# Patient Record
Sex: MALE | Race: AMERICAN INDIAN OR ALASKA NATIVE
[De-identification: names, ages, dates, MRNs, and addresses within clinical notes are randomized per-mention and may not be internally consistent; named-entity substitution may affect disease eponyms.]

---

## 2017-05-26 ENCOUNTER — HOSPITAL ENCOUNTER (EMERGENCY)
Dept: HOSPITAL 31 - C.ER | Age: 57
Discharge: HOME | End: 2017-05-26
Payer: COMMERCIAL

## 2017-05-26 VITALS
DIASTOLIC BLOOD PRESSURE: 84 MMHG | RESPIRATION RATE: 18 BRPM | TEMPERATURE: 98.2 F | OXYGEN SATURATION: 97 % | SYSTOLIC BLOOD PRESSURE: 152 MMHG | HEART RATE: 67 BPM

## 2017-05-26 DIAGNOSIS — M54.41: Primary | ICD-10-CM

## 2017-05-26 NOTE — C.PDOC
History Of Present Illness


57 yr old male presents to the ER for exacerbation of chronic back pain for the 

past several days, right>left lower back, occasionally radiating to the right 

leg. Patient states he has had pain for the past several 8+ years. States the 

pain is made worse with sitting and going to a sitting position. Patient is s/p 

outpatient MRI this week and is pending follow up for results. Patient reports 

relief with naproxen. Denies trauma, injury, abdominal pain, constipation, 

dysuria, incontinence, weakness or numbness. 








EXAC CHRONIC BACK PAIN X SEV DAYS. R>L LOWER BACK, OCC RADIATION R LEG. PS HAS 

HAD PAIN FOR 8+YRS. S/P OUTPT MRI THIS WEEK, PENDING FU FOR RESULTS. +RELIEF W 

NAPROXN. NO TRAUMA WEAKNESS NUMB OTHER ASSOC SX. PAIN WORSE W SITTING AND GOING 

TO SITTING POSITION.





EXAM


MILD DIST NONTOXIC


BACK LIMITED FULL EXTENSION. NO FOCAL TEND


NEURO INTACT


SKIN NEG





MDM


SCIATICA. DEXAMETHASONE ADVISED CONT NSAIDS. LIDODERM, TYLENOL, PMD FU


Time Seen by Provider: 05/26/17 14:07


Chief Complaint (Nursing): Back Pain


Onset/Duration Of Symptoms: Persistent (8+ years ), Worse Since (Last several 

days )





Past Medical History


Reviewed: Historical Data, Nursing Documentation, Vital Signs


Vital Signs: 


 Last Vital Signs











Temp  98.6 F   05/26/17 13:54


 


Pulse  88   05/26/17 13:54


 


Resp  15   05/26/17 13:54


 


BP  146/89   05/26/17 13:54


 


Pulse Ox  99   05/26/17 14:37











Family History: States: No Known Family Hx





- Social History


Hx Alcohol Use: Yes


Hx Substance Use: No





Review Of Systems


Except As Marked, All Systems Reviewed And Found Negative.


Gastrointestinal: Negative for: Abdominal Pain, Constipation


Genitourinary: Negative for: Dysuria, Incontinence


Musculoskeletal: Positive for: Back Pain (Right>Left lower back ), Leg Pain (

Radiating pain to the right leg )


Neurological: Negative for: Weakness, Numbness





Physical Exam





- Physical Exam


Appears: Non-toxic, In Acute Distress (Mild )


Skin: Warm, Dry, No Rash


Neck: Normal, Normal ROM, Supple


Chest: Symmetrical, No Tenderness


Cardiovascular: Rhythm Regular, No Murmur


Respiratory: Normal Breath Sounds, No Rales, No Rhonchi, No Stridor, No Wheezing


Back: Other (Limited full extension. No focal tenderness. )


Extremity: Normal ROM, No Swelling


Neurological/Psych: Oriented x3, Normal Speech, Normal Motor





ED Course And Treatment


O2 Sat by Pulse Oximetry: 99





Progress





- Re-Evaluation


Re-evaluation Note: 





05/26/17 14:12


[facility]


12/01/2016   TRAMADOL HCL 50 MG TABLET   60.0   20 DAYS





- Data Reviewed


Data Reviewed: Other ([facility])





Medical Decision Making


Medical Decision Making: 


PLAN:


* Lidoderm TD 


* Tylenol PO 





NOTE: 


Sciatica pain. Dexamethasone advised and continue with NSAIDS. Lidoderm, 

Tylenol use and to follow up with PMD. 





Disposition


Counseled Patient/Family Regarding: Diagnosis, Need For Followup, Rx Given





- Disposition


Referrals: 


Juan Pablo Avendano MD [Staff Provider] - 


Disposition: HOME/ ROUTINE


Disposition Time: 14:25


Condition: IMPROVED


Additional Instructions: 


REMOVE PATCH 12 HRS AFTER INITIAL APPLICATION.


TAKE TYLENOL AS DIRECTED FOR PAIN IN ADDITION TO NAPROXYN


Prescriptions: 


Dexamethasone 12 mg PO ONCE #2 tab


Lidocaine 5% [Lidoderm] 1 ea TD PRN PRN #10 patch


 PRN Reason: Pain, Moderate (4-7)


Instructions:  Sciatica (ED)





- Clinical Impression


Clinical Impression: 


 Sciatica








- Scribe Statement


The provider has reviewed the documentation as recorded by the Scribe


Britney Price


Provider Attestation: 


All medical record entries made by the Scribe were at my direction and 

personally dictated by me. I have reviewed the chart and agree that the record 

accurately reflects my personal performance of the history, physical exam, 

medical decision making, and the department course for this patient. I have 

also personally directed, reviewed, and agree with the discharge instructions 

and disposition.

## 2017-08-30 ENCOUNTER — HOSPITAL ENCOUNTER (EMERGENCY)
Dept: HOSPITAL 31 - C.ER | Age: 57
Discharge: HOME | End: 2017-08-30
Payer: COMMERCIAL

## 2017-08-30 VITALS
RESPIRATION RATE: 20 BRPM | TEMPERATURE: 98 F | DIASTOLIC BLOOD PRESSURE: 88 MMHG | SYSTOLIC BLOOD PRESSURE: 123 MMHG | HEART RATE: 70 BPM | OXYGEN SATURATION: 99 %

## 2017-08-30 DIAGNOSIS — M54.5: Primary | ICD-10-CM

## 2017-08-30 DIAGNOSIS — G89.29: ICD-10-CM

## 2017-08-30 NOTE — C.PDOC
History Of Present Illness


57 year old male presents to ED for evaluation of exacerbation of chronic back 

pain and bilateral knee pain for the last 4 months. Pt states he has had 

similar pain for the last 8 years. Patient states pain is worse in supine 

position and with sudden twisting of back while walking. Outpatient MRI report 

was reviewed, which shows herniated disc in the lower lumbar region, and 

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 Also reports bilateral knee pain that is 

worse with weight bearing. Outpatient x-ray of knee also shows degenerative 

joint disease. No focal weakness, or numbness. States he is a prior pt of 

Phyllis Coyne at [facility], but is no longer a pt since 

he has [facility] now. Pt is pending first clinic appointment on 10/3. Pt was 

advised that he may need back surgery before switching insurance. Pt states he 

was previously taking Prednisone 10mg and Naprosyn but ran out on 8/19, and 

states "I've been cutting them in half".








CO EXAC CHRONIC BACK PAIN AND B/L KNEE PAIN X 4 MONTHS. PS HAS HAD SIM PAIN X 8 

YRS. OUTPT MRI REPORTS REVIEWED, +SONU DISC MULT LOWER LUMBAR, +DJD HIP. PAIN 

WORSE WHEN SUPINE OR SUDDEN TWISTING BACK WHILE WALKING. +B/L KNEE PAIN WORSE W 

WT BEAR, +DJD OUTPT XRAY REPORTS. NO FOCAL WEAK,NUMB. 


PS PRIOR PT OF PHYLLIS COYNE @ [facility]  BUT NO LONGER 

DUE TO NOW HAVING [facility]. PENDING FIRST CLINIC APPT 10/3.  PS WAS ADVISED 

NEEDS BACK SURGERY BEFORE SWITCHING INSURANCE.





PREVIOUSLY ON PREDNISONE 10 MG AND NAPROSYN BUT RAN OUT 8/19. "MICHAEL BEEN CUTTING 

THEM IN HALF". 





EXAM


MILD DIST NONTOXIC


BACK LIMITED ROM DUE TO PAIN. FULL FLEX.


NEURO INTACT


GAIT WNL





MDM


PT ADVISED OF NARC PAIN MED POLICY. TYL #4 AT NIGHT, REFILL NAPROSYN AND 

PREDNISON,  CLINIC.


Time Seen by Provider: 08/30/17 09:33


Chief Complaint (Nursing): Back Pain


History Per: Patient


History/Exam Limitations: no limitations


Onset/Duration Of Symptoms: Days


Current Symptoms Are (Timing): Still Present


Quality Of Discomfort: "Pain"


Previous Symptoms: Chronic Pain.  denies: Prior Injury


Associated Symptoms: None.  denies: Incontinence, New Weakness, New Numbness


Recent travel outside of the [place]: No


Additional History Per: Patient





Past Medical History


Reviewed: Historical Data, Nursing Documentation, Vital Signs


Vital Signs: 


 Last Vital Signs











Temp  98 F   08/30/17 09:02


 


Pulse  70   08/30/17 09:02


 


Resp  20   08/30/17 09:02


 


BP  123/88   08/30/17 09:02


 


Pulse Ox  99   08/30/17 10:44














- Medical History


PMH: Arthritis


Family History: States: Unknown Family Hx





- Social History


Hx Alcohol Use: Yes


Hx Substance Use: Yes





- Immunization History


Hx Tetanus Toxoid Vaccination: Yes


Hx Influenza Vaccination: Yes


Hx Pneumococcal Vaccination: Yes





Review Of Systems


Except As Marked, All Systems Reviewed And Found Negative.


Constitutional: Negative for: Fever, Chills


Gastrointestinal: Negative for: Nausea, Vomiting


Genitourinary: Negative for: Dysuria, Frequency, Hematuria


Musculoskeletal: Positive for: Back Pain, Leg Pain (bilateral knee pain)


Neurological: Negative for: Weakness, Numbness





Physical Exam





- Physical Exam


Appears: Non-toxic, Other (In mild distress)


Skin: Normal Color, Warm, Dry


Head: Atraumatic, Normacephalic


Back: No Vertebral Tenderness, Decreased ROM (secondary to pain. Full flex), No 

Paraspinal Tenderness


Extremity: Normal ROM (FROM of bilateral knee joint), No Tenderness, No Calf 

Tenderness, Capillary Refill (<2 sec.), No Deformity, No Swelling


Extremity: Bilateral: Atraumatic, Normal Color And Temperature, Normal ROM


Neurological/Psych: Oriented x3, Normal Speech, Normal Cognition, Normal Motor, 

Normal Sensation, Other (neuro intact)


Gait: Steady





ED Course And Treatment


O2 Sat by Pulse Oximetry: 99 (on RA)


Pulse Ox Interpretation: Normal





Medical Decision Making


Medical Decision Making: 


Pt was advised of Narcotic pain med policy. Advised to take Tylenol #4 at night

, and to refill Naprosyn and Prednisone. Instructed followup in clinic. 








Disposition


Counseled Patient/Family Regarding: Diagnosis, Need For Followup, Rx Given





- Disposition


Referrals: 


[facility] [Outside]


[facility] [Outside]


Disposition: HOME/ ROUTINE


Disposition Time: 09:50


Condition: IMPROVED


Prescriptions: 


Acetaminophen/Cod NO 4 [Tylenol/Cod 300 mg-60 mg] 1 tab PO Q4 PRN #20 tab


 PRN Reason: Pain, Moderate (4-7)


Naproxen 500 mg PO BID #30 tab


predniSONE [predniSONE Tab] 10 mg PO DAILY #30 tab


Instructions:  Chronic Pain (ED)


Forms:  [facility] (English)





- Clinical Impression


Clinical Impression: 


 Acute exacerbation of chronic low back pain








- Scribe Statement


The provider has reviewed the documentation as recorded by the Scribe





Felix Fox





All medical record entries made by the Felipeibe were at my direction and 

personally dictated by me. I have reviewed the chart and agree that the record 

accurately reflects my personal performance of the history, physical exam, 

medical decision making, and the department course for this patient. I have 

also personally directed, reviewed, and agree with the discharge instructions 

and disposition.

## 2019-04-14 ENCOUNTER — HOSPITAL ENCOUNTER (OUTPATIENT)
Dept: HOSPITAL 31 - C.ER | Age: 59
Setting detail: OBSERVATION
LOS: 1 days | Discharge: LEFT BEFORE DISCHARGE | End: 2019-04-15
Attending: INTERNAL MEDICINE | Admitting: INTERNAL MEDICINE
Payer: MEDICAID

## 2019-04-14 VITALS — RESPIRATION RATE: 20 BRPM

## 2019-04-14 VITALS — BODY MASS INDEX: 21.2 KG/M2

## 2019-04-14 DIAGNOSIS — R07.9: ICD-10-CM

## 2019-04-14 DIAGNOSIS — I10: ICD-10-CM

## 2019-04-14 DIAGNOSIS — M19.90: ICD-10-CM

## 2019-04-14 DIAGNOSIS — F17.210: Primary | ICD-10-CM

## 2019-04-14 DIAGNOSIS — D53.9: ICD-10-CM

## 2019-04-14 DIAGNOSIS — F12.10: ICD-10-CM

## 2019-04-14 DIAGNOSIS — D72.819: ICD-10-CM

## 2019-04-14 LAB
ALBUMIN SERPL-MCNC: 4 G/DL (ref 3.5–5)
ALBUMIN/GLOB SERPL: 1.3 {RATIO} (ref 1–2.1)
ALT SERPL-CCNC: 20 U/L (ref 21–72)
APTT BLD: 30 SECONDS (ref 21–34)
AST SERPL-CCNC: 83 U/L (ref 17–59)
BASOPHILS # BLD AUTO: 0 K/UL (ref 0–0.2)
BASOPHILS NFR BLD: 1 % (ref 0–2)
BILIRUB UR-MCNC: NEGATIVE MG/DL
BNP SERPL-MCNC: 43.3 PG/ML (ref 0–900)
BUN SERPL-MCNC: 15 MG/DL (ref 9–20)
CALCIUM SERPL-MCNC: 8.7 MG/DL (ref 8.6–10.4)
CK MB SERPL-MCNC: 0.93 NG/ML (ref 0–3.38)
CK MB SERPL-MCNC: 0.97 NG/ML (ref 0–3.38)
COLOR UR: YELLOW
EOSINOPHIL # BLD AUTO: 0.1 K/UL (ref 0–0.7)
EOSINOPHIL NFR BLD: 1.8 % (ref 0–4)
ERYTHROCYTE [DISTWIDTH] IN BLOOD BY AUTOMATED COUNT: 13.2 % (ref 11.5–14.5)
GFR NON-AFRICAN AMERICAN: > 60
GLUCOSE UR STRIP-MCNC: NEGATIVE MG/DL
HGB BLD-MCNC: 12.7 G/DL (ref 12–18)
INR PPP: 1
LEUKOCYTE ESTERASE UR-ACNC: NEGATIVE LEU/UL
LIPASE: 144 U/L (ref 23–300)
LYMPHOCYTES # BLD AUTO: 1.5 K/UL (ref 1–4.3)
LYMPHOCYTES NFR BLD AUTO: 42.1 % (ref 20–40)
MCH RBC QN AUTO: 34.8 PG (ref 27–31)
MCHC RBC AUTO-ENTMCNC: 33.3 G/DL (ref 33–37)
MCV RBC AUTO: 104.5 FL (ref 80–94)
MONOCYTES # BLD: 0.5 K/UL (ref 0–0.8)
MONOCYTES NFR BLD: 13.1 % (ref 0–10)
NEUTROPHILS # BLD: 1.5 K/UL (ref 1.8–7)
NEUTROPHILS NFR BLD AUTO: 42 % (ref 50–75)
NRBC BLD AUTO-RTO: 0 % (ref 0–2)
PH UR STRIP: 6 [PH] (ref 5–8)
PLATELET # BLD: 202 K/UL (ref 130–400)
PMV BLD AUTO: 8 FL (ref 7.2–11.7)
PROT UR STRIP-MCNC: NEGATIVE MG/DL
PROTHROMBIN TIME: 10.4 SECONDS (ref 9.7–12.2)
RBC # BLD AUTO: 3.65 MIL/UL (ref 4.4–5.9)
RBC # UR STRIP: (no result) /UL
SP GR UR STRIP: 1.02 (ref 1–1.03)
SQUAMOUS EPITHIAL: < 1 /HPF (ref 0–5)
TROPONIN I SERPL-MCNC: 0.01 NG/ML (ref 0–0.12)
UROBILINOGEN UR-MCNC: 0.2 MG/DL (ref 0.2–1)
WBC # BLD AUTO: 3.5 K/UL (ref 4.8–10.8)

## 2019-04-14 PROCEDURE — 83992 ASSAY FOR PHENCYCLIDINE: CPT

## 2019-04-14 PROCEDURE — 85025 COMPLETE CBC W/AUTO DIFF WBC: CPT

## 2019-04-14 PROCEDURE — 83036 HEMOGLOBIN GLYCOSYLATED A1C: CPT

## 2019-04-14 PROCEDURE — 83735 ASSAY OF MAGNESIUM: CPT

## 2019-04-14 PROCEDURE — 83880 ASSAY OF NATRIURETIC PEPTIDE: CPT

## 2019-04-14 PROCEDURE — 80353 DRUG SCREENING COCAINE: CPT

## 2019-04-14 PROCEDURE — 80345 DRUG SCREENING BARBITURATES: CPT

## 2019-04-14 PROCEDURE — 85730 THROMBOPLASTIN TIME PARTIAL: CPT

## 2019-04-14 PROCEDURE — 71045 X-RAY EXAM CHEST 1 VIEW: CPT

## 2019-04-14 PROCEDURE — 36415 COLL VENOUS BLD VENIPUNCTURE: CPT

## 2019-04-14 PROCEDURE — 78452 HT MUSCLE IMAGE SPECT MULT: CPT

## 2019-04-14 PROCEDURE — 85610 PROTHROMBIN TIME: CPT

## 2019-04-14 PROCEDURE — 80053 COMPREHEN METABOLIC PANEL: CPT

## 2019-04-14 PROCEDURE — 84484 ASSAY OF TROPONIN QUANT: CPT

## 2019-04-14 PROCEDURE — 84443 ASSAY THYROID STIM HORMONE: CPT

## 2019-04-14 PROCEDURE — 81001 URINALYSIS AUTO W/SCOPE: CPT

## 2019-04-14 PROCEDURE — 80324 DRUG SCREEN AMPHETAMINES 1/2: CPT

## 2019-04-14 PROCEDURE — 86703 HIV-1/HIV-2 1 RESULT ANTBDY: CPT

## 2019-04-14 PROCEDURE — 80361 OPIATES 1 OR MORE: CPT

## 2019-04-14 PROCEDURE — 93005 ELECTROCARDIOGRAM TRACING: CPT

## 2019-04-14 PROCEDURE — 80346 BENZODIAZEPINES1-12: CPT

## 2019-04-14 PROCEDURE — 80061 LIPID PANEL: CPT

## 2019-04-14 PROCEDURE — 93306 TTE W/DOPPLER COMPLETE: CPT

## 2019-04-14 PROCEDURE — 99285 EMERGENCY DEPT VISIT HI MDM: CPT

## 2019-04-14 PROCEDURE — 86706 HEP B SURFACE ANTIBODY: CPT

## 2019-04-14 PROCEDURE — 80074 ACUTE HEPATITIS PANEL: CPT

## 2019-04-14 PROCEDURE — 80349 CANNABINOIDS NATURAL: CPT

## 2019-04-14 PROCEDURE — 83690 ASSAY OF LIPASE: CPT

## 2019-04-14 PROCEDURE — 93017 CV STRESS TEST TRACING ONLY: CPT

## 2019-04-14 PROCEDURE — 80358 DRUG SCREENING METHADONE: CPT

## 2019-04-14 NOTE — RAD
Chest x-ray single frontal view 



HISTORY:

Chest pain. 



COMPARISON:

None available. 



FINDINGS:

Mild venous congestion. 



Biapical pleural thickening with upper lobe granulomatous changes. 



Heart size within normal limits. 



Degenerative changes in the spine. 



Impression: 



Mild venous congestion. 



Biapical pleural thickening with upper lobe granulomatous changes.

## 2019-04-14 NOTE — C.PDOC
History Of Present Illness


Patient presents to the emergency department complaints of chest pain since 

yesterday. Patient denies fever, chills, nausea, and vomiting. Patient is 

speaking in complete sentences. Admits to smoking and drinking almost daily.


Time Seen by Provider: 04/14/19 01:22


Chief Complaint (Nursing): Chest Pain


History Per: Patient


History/Exam Limitations: no limitations


Onset/Duration Of Symptoms: Days (1)


Current Symptoms Are (Timing): Still Present


Context: Other


Severity: Moderate


Pain Scale Rating Of: 4


Quality: Dull, Tightness, Pressure


Associated Symptoms: denies: Nausea, Other (vomiting, fever, vomiting, shortness

of breath)


Modifying Factors: None


Exacerbating Factors: None


Alleviating Factors: None


Recent travel outside of the [place]: No


Additional History Per: Patient





Past Medical History


Reviewed: Historical Data, Nursing Documentation, Vital Signs


Vital Signs: 





                                Last Vital Signs











Temp  98.2 F   04/14/19 01:13


 


Pulse  66   04/14/19 01:13


 


Resp  18   04/14/19 01:13


 


BP  126/79   04/14/19 01:13


 


Pulse Ox  97   04/14/19 01:13














- Medical History


PMH: Arthritis


Surgical History: No Surg Hx


Family History: States: No Known Family Hx





- Social History


Hx Alcohol Use: Yes


Hx Substance Use: Yes





- Immunization History


Hx Tetanus Toxoid Vaccination: Yes


Hx Influenza Vaccination: Yes


Hx Pneumococcal Vaccination: Yes





Review Of Systems


Constitutional: Negative for: Fever, Chills


Eyes: Negative for: Vision Change


ENT: Negative for: Throat Pain


Cardiovascular: Positive for: Chest Pain


Respiratory: Negative for: Cough, Shortness of Breath


Gastrointestinal: Negative for: Nausea, Vomiting


Genitourinary: Negative for: Dysuria


Musculoskeletal: Negative for: Back Pain


Skin: Negative for: Rash


Neurological: Negative for: Weakness, Numbness


Psych: Negative for: Anxiety





Physical Exam





- Physical Exam


Appears: Non-toxic, No Acute Distress


Skin: Warm, Dry


Head: Normacephalic


Eye(s): bilateral: Normal Inspection


Oral Mucosa: Moist


Neck: Normal ROM, Supple


Chest: Symmetrical, No Tenderness


Cardiovascular: Rhythm Regular, No Murmur


Respiratory: No Rales, No Rhonchi, No Wheezing


Gastrointestinal/Abdominal: Soft, No Tenderness


Back: No CVA Tenderness


Extremity: Normal ROM


Extremity: Bilateral: Atraumatic


Pulses: Left Dorsalis Pedis: Normal, Right Dorsalis Pedis: Normal


Neurological/Psych: Oriented x3


Gait: Steady





ED Course And Treatment





- Laboratory Results


Result Diagrams: 


                                 04/14/19 01:51





                                 04/14/19 01:51


ECG: Interpreted By Me, Viewed By Me


ECG Rhythm: Sinus Rhythm (62), Nonspecific Changes


O2 Sat by Pulse Oximetry: 97 (RA)


Pulse Ox Interpretation: Normal





- Radiology


CXR: Interpreted by Me, Viewed By Me


CXR Interpretation: No: Infiltrates, Fracture, Pnemothorax


Progress Note: Plan:  EKG.  Chemistry.  CBC.  CXR.  Ecotrin 325mg PO.  

Urinalysis





Disposition


Discussed With Dr.: Juan Pablo Avendano


Comment: accepted the pt on his service and took over the care at 3:52 AM


Doctor Will See Patient In The: Hospital


Counseled Patient/Family Regarding: Studies Performed, Diagnosis





- Disposition


Disposition: HOSPITALIZED


Disposition Time: 03:52


Condition: FAIR


Forms:  [facility] (English)





- POA


Present On Arrival: None





- Clinical Impression


Clinical Impression: 


 Chest pain








- Scribe Statement


The provider has reviewed the documentation as recorded by the Scribe (Evelio Tsai)


Provider Attestation: 


All medical record entries made by the Scribe were at my direction and 

personally dictated by me. I have reviewed the chart and agree that the record 

accurately reflects my personal performance of the history, physical exam, 

medical decision making, and the department course for this patient. I have also

personally directed, reviewed, and agree with the discharge instructions and 

disposition.





Decision To Admit





- Pt Status Changed To:


Hospital Disposition Of: Observation





- .


Bed Request Type: Telemetry


Admitting Physician: Juan Pablo Avendano


Patient Diagnosis: 


 Chest pain

## 2019-04-15 VITALS — SYSTOLIC BLOOD PRESSURE: 151 MMHG | DIASTOLIC BLOOD PRESSURE: 96 MMHG | TEMPERATURE: 97.6 F | HEART RATE: 65 BPM

## 2019-04-15 VITALS — OXYGEN SATURATION: 98 %

## 2019-04-15 LAB
ALBUMIN SERPL-MCNC: 4.4 G/DL (ref 3.5–5)
ALBUMIN/GLOB SERPL: 1.2 {RATIO} (ref 1–2.1)
ALT SERPL-CCNC: 25 U/L (ref 21–72)
AST SERPL-CCNC: 73 U/L (ref 17–59)
BASOPHILS # BLD AUTO: 0 K/UL (ref 0–0.2)
BASOPHILS NFR BLD: 0.8 % (ref 0–2)
BUN SERPL-MCNC: 11 MG/DL (ref 9–20)
CALCIUM SERPL-MCNC: 9.6 MG/DL (ref 8.6–10.4)
EOSINOPHIL # BLD AUTO: 0 K/UL (ref 0–0.7)
EOSINOPHIL NFR BLD: 0.4 % (ref 0–4)
ERYTHROCYTE [DISTWIDTH] IN BLOOD BY AUTOMATED COUNT: 13.3 % (ref 11.5–14.5)
GFR NON-AFRICAN AMERICAN: > 60
HDLC SERPL-MCNC: 101 MG/DL (ref 30–70)
HEPATITIS A IGM: NEGATIVE
HEPATITIS B CORE AB: NEGATIVE
HEPATITIS C ANTIBODY: NEGATIVE
HGB BLD-MCNC: 15.4 G/DL (ref 12–18)
LDLC SERPL-MCNC: 93 MG/DL (ref 0–129)
LYMPHOCYTES # BLD AUTO: 1 K/UL (ref 1–4.3)
LYMPHOCYTES NFR BLD AUTO: 23.8 % (ref 20–40)
MCH RBC QN AUTO: 35.7 PG (ref 27–31)
MCHC RBC AUTO-ENTMCNC: 34.5 G/DL (ref 33–37)
MCV RBC AUTO: 103.4 FL (ref 80–94)
MONOCYTES # BLD: 0.4 K/UL (ref 0–0.8)
MONOCYTES NFR BLD: 10.2 % (ref 0–10)
NEUTROPHILS # BLD: 2.7 K/UL (ref 1.8–7)
NEUTROPHILS NFR BLD AUTO: 64.8 % (ref 50–75)
NRBC BLD AUTO-RTO: 0.2 % (ref 0–2)
PLATELET # BLD: 209 K/UL (ref 130–400)
PMV BLD AUTO: 9 FL (ref 7.2–11.7)
RBC # BLD AUTO: 4.31 MIL/UL (ref 4.4–5.9)
WBC # BLD AUTO: 4.2 K/UL (ref 4.8–10.8)

## 2019-04-15 NOTE — CARD
--------------- APPROVED REPORT --------------





Date of service: 04/14/2019



EKG Measurement

Heart [id number]

NV [id number]

[id number]

[id number]

[id number]



<Conclusion>

Normal sinus rhythm

Normal ECG

## 2019-04-15 NOTE — CARD
--------------- APPROVED REPORT --------------





Date of service: 04/15/2019



Protocol: GIN

Test Type: NUCLEAR STRESS

Test Indications: CP

Medical History: CP

Target HR: 162 bpm

Resting ECG: normal

Resting Heart Rate: 57 bpm

Resting Blood Pressure: 128/80mmHg

submaximum (85%): 138 bpm



TEST SUMMARY

[id number]/80.0.

PRETESTWARM-UP00:[id number]/.0.

EXERCISESTAGE 103:[id number]/80.0.

EXERCISESTAGE 203:[id number]/80.0.

EXERCISESTAGE 302:[id number]/80.0.

[id number]/80.0.



POST EXERCISE

Reason for Termination: Fatigue

Target HR: No

Max HR: 137 bpm

85% of Maximum Predicted HR: 162 bpm

Exercise duration: 08:13 min:sec, 3 Stage

Exercise capacity: 10.1METs

Max Blood Pressure: 160/80mmHg

Blood Pressure response to exercise: normal resting BP - appropriate 

response

Heart Rate response to exercise: appropriate

Chest Pain: No, none

Angina index: 0

Arrhythmia: Yes, atrial premature beats

ST Change: No, none

Deviation: 0 mm



INTERPRETATION

Stress EKG Conclusion: Normal stress test



EXAM: Myocardial Perfusion REST/STRESS



Imaging Protocol

The imaging protocol used to acquire images was Rest Tc-99m/stress 

Tc-99m 1 day

Rest Spect myocardial perfusion imaging was performed in supine 

position 45 minutes following the injection of 13.1 mCi of Tc-99 

Myoview.

Gated Stress Spect was performed 45 minutes after intravenous 32.0 

mCi Tc-99 Myoview injection.

The images were gated to evaluate regional wall motion and calculate 

ventricular ejection fraction.Images were reconstructed using 

backfilter projection method in short horizontal and verticle long 

axis. Spect slices were generated.



RESTING DATA

EDV99.55glHT5.10L/min

ESV45.00mlMyocardial Vcuj306.00g

Av. Heart Rate56.00bpm

EF55.00%



STRESS DATA

EDV86.47pfXP1.30L/min

ESV32.00mlMyocardial Khov330.00g

EF63.00%

Regional WT score at stress:1.00

Regional WM score at stress:0.00

Summed WT score at stress:12.00

Av. Heart Rate61.00bpmSummed WM score at stress:11.00



LV Perf. Quant

17 Seg. SSS0.00

17 Seg. SRS2.00

17 Seg. SDS0.00

Stress Defect Extent (% LAD)0.00Rest Defect Extent (% 

LAD)0.00Rev. Defect Extent (% LAD)0.00

Stress Defect Extent (% LCX)0.00Rest Defect Extent (% 

LCX)13.80Rev. Defect Extent (% LCX)0.00

Stress Defect Extent (% RCA)0.00Rest Defect Extent (% 

RCA)8.90Rev. Defect Extent (% RCA)0.00

Stress Defect Extent (% SALVATORE)0.00Rest Defect Extent (% 

SALVATORE)5.90Rev. Defect Extent (% SALVATORE)0.00



Left Ventricle

LV Size/Shape: The left ventricle is normal size.

LV Function:Left ventricle systolic function is normal.

The Ejection Fraction is 63% up from 55% at rest.

Regional Wall Motion:No regional wall motion abnormalities noted.



Metabolism/Perfusion

Reversible/Irreversible: There is a medium  perfusion/metabolism 

defect in the Basal inferior wall, at rest only, probable artifact.



Conclusion

1. Left ventricle systolic function is normal.

2. The Ejection Fraction is 63% up from 55% at rest.

3. No regional wall motion abnormalities noted.

4. No Ischemia, adequate exercise tolerance.

## 2019-04-15 NOTE — CP.PCM.PN
Subjective





- Date & Time of Evaluation


Date of Evaluation: 04/15/19


Time of Evaluation: 07:45





- Subjective


Subjective: 





PGY3 note for Dr. Avendano's service





Pt seen and examined at bedside. Nursing reports no acute events overnight. 

Patient found resting comfortably after his stress test. Patient states he "had 

no difficulty" with the stress test and denies chest pain, or SOB. Denies 

previous history of MI or CAD. Denies taking any home medications. Admits 

smoking "1/5 pack per day x ~10 years." 





Objective





- Vital Signs/Intake and Output


Vital Signs (last 24 hours): 


                                        











Temp Pulse Resp BP Pulse Ox


 


 98.2 F   54 L  20   159/89 H  98 


 


 04/14/19 23:25  04/14/19 23:25  04/14/19 23:25  04/14/19 23:25  04/14/19 23:25











- Medications


Medications: 


                               Current Medications





Aspirin (Aspirin)  325 mg PO DAILY On license of [facility]


Metoprolol Tartrate (Lopressor)  50 mg PO BID On license of [facility]


   Last Admin: 04/14/19 17:53 Dose:  50 mg











- Labs


Labs: 


                                        





                                 04/14/19 01:51 





                                 04/14/19 01:51 





                                        











PT  10.4 SECONDS (9.7-12.2)   04/14/19  01:51    


 


INR  1.0   04/14/19  01:51    


 


APTT  30 SECONDS (21-34)   04/14/19  01:51    














- Constitutional


Appears: Non-toxic, No Acute Distress





- Head Exam


Head Exam: ATRAUMATIC





- Eye Exam


Eye Exam: EOMI, Normal appearance.  absent: Scleral icterus


Pupil Exam: PERRL





- ENT Exam


ENT Exam: Mucous Membranes Moist





- Respiratory Exam


Respiratory Exam: Clear to Ausculation Bilateral, NORMAL BREATHING PATTERN.  

absent: Rales, Rhonchi, Wheezes





- Cardiovascular Exam


Cardiovascular Exam: REGULAR RHYTHM, +S1, +S2





- GI/Abdominal Exam


GI & Abdominal Exam: Soft, Normal Bowel Sounds.  absent: Tenderness





- Extremities Exam


Extremities Exam: Normal Inspection.  absent: Pedal Edema





- Back Exam


Back Exam: absent: CVA tenderness (L), CVA tenderness (R)





- Neurological Exam


Neurological Exam: Alert, Awake, Oriented x3





- Psychiatric Exam


Psychiatric exam: Normal Affect, Normal Mood





- Skin


Skin Exam: Normal Color, Warm





Assessment and Plan





- Assessment and Plan (Free Text)


Plan: 





Chest pain, R/o ACS


Tele/Obs


NPO for stress test


Troponins negative x 3; BNP 43


CXR (4/14/19): Mild venous congestion. Biapical pleural thickening w/ upper lobe

granulmatous changes


EKG (4/14/19): NSR @ 62 bpm; No acute ST/T wave changes





Dr Smith, Cardiology consultant


- Myocardial stress test for today


- f/u results





ASA 325mg PO Daily





f/u stress test, ECHO


f/u A1c, TSH, Lipid panel


f/u ASCVD risk score





Macrocytic Anemia


Hgb 12.7, .5


f/u alcohol usage





Elevated AST


AST 80 on admission


f/u hepatitis panel





HTN


Start Chlorithalidone 25mg PO Daily





Marijuana abuse D/o


UDS + for cannabinoids





Leukopenia


WBC 3.5


f/u hep C, HIV





Electrolyte Abnormality


Will monitor and intervene accordingly





PPX


Lovenox 40mg SC Daily


SCDs


GI not indicated





Rohit Sierra PGY-3


MGMT per Romana

## 2019-04-16 NOTE — CARD
--------------- APPROVED REPORT --------------





Date of service: 04/15/2019



EXAM: Two-dimensional and M-mode echocardiogram with Doppler and 

color Doppler.



Other Information 

Quality : GoodRhythm : 



INDICATION

Chest Pain alcohol and substance use



RISK FACTORS

Hypertension 



2D DIMENSIONS 

IVSd0.9   (0.7-1.1cm)LVDd4.1   (3.9-5.9cm)

PWd1.0   (0.7-1.1cm)LA Hbsnnf01   (18-58mL)

LVDs2.8   (2.5-4.0cm)FS (%) 30.3   %

LVEF (%)58.1   (>50%)LVEF (Huitron's)56.70 %



M-Mode DIMENSIONS 

Left Atrium (MM)2.89   (2.5-4.0cm)IVSd0.95   (0.7-1.1cm)

Aortic Root3.67   (2.2-3.7cm)LVDd4.49   (4.0-5.6cm)

Aortic Cusp Exc.2.57   (1.5-2.0cm)PWd0.86   (0.7-1.1cm)

FS (%) 33   %LVDs2.99   (2.0-3.8cm)

LVEF (%)62   (>50%)



Mitral Valve

MV E Mqjuorel35.8cm/sMV A Njilctla05.8cm/sE/A ratio1.1



TDI

Lateral E' Peak V8.03cm/sMedial E' Peak V5.90cm/sE/Lateral E'6.3

E/Medial E'8.6



Tricuspid Valve

TR Peak [id number]/sTR Peak Gr.16mmHg



<Conclusion>

normal size la,lv & ra rv.

normal lv wall motion,thickness,systolic & diastolic function with 

lvef of 60-65%,

normal aortic,mitral,tv & pv.

trace pi.

no pericardial effusion.

normal size aortic root & ivc.

## 2019-04-16 NOTE — HP
HISTORY OF PRESENT ILLNESS:  A 59-year-old male that came to the hospital

with a chief complaint of chest pain, intermittent.  The patient came to

the ER, advised admission.



PHYSICAL EXAMINATION:

GENERAL:  The patient is awake, alert, and oriented.

VITAL SIGNS:  Temperature 98, pulse 90.

HEENT:  Within normal limits.

NECK:  Supple.

CHEST:  Symmetrical.

HEART:  Regular.

ABDOMEN:  Soft.

EXTREMITIES:  No edema.



IMPRESSION AND PLAN:  The patient suffers from chest pain, rule out acute

coronary syndrome.  The patient is to get bed rest, cardiac enzymes. 

Cardiology consult.







__________________________________________

Juan Pablo Avendano MD





DD:  04/15/2019 12:37:33

DT:  04/15/2019 18:27:36

Job # [id number]

## 2023-08-10 ENCOUNTER — NEW PATIENT COMPREHENSIVE (OUTPATIENT)
Dept: URBAN - METROPOLITAN AREA CLINIC 110 | Facility: CLINIC | Age: 63
End: 2023-08-10

## 2023-08-10 DIAGNOSIS — H04.123: ICD-10-CM

## 2023-08-10 DIAGNOSIS — H25.813: ICD-10-CM

## 2023-08-10 DIAGNOSIS — H52.4: ICD-10-CM

## 2023-08-10 PROCEDURE — 92015 DETERMINE REFRACTIVE STATE: CPT

## 2023-08-10 PROCEDURE — 92004 COMPRE OPH EXAM NEW PT 1/>: CPT

## 2023-08-10 ASSESSMENT — TONOMETRY
OS_IOP_MMHG: 14
OD_IOP_MMHG: 14

## 2023-08-10 ASSESSMENT — VISUAL ACUITY
OS_SC: 20/40-2
OS_PH: 20/40+1
OD_PH: 20/25-2
OD_SC: 20/40-3

## 2024-08-27 ENCOUNTER — ESTABLISHED COMPREHENSIVE EXAM (OUTPATIENT)
Dept: URBAN - METROPOLITAN AREA CLINIC 110 | Facility: CLINIC | Age: 64
End: 2024-08-27

## 2024-08-27 DIAGNOSIS — H04.123: ICD-10-CM

## 2024-08-27 DIAGNOSIS — H40.013: ICD-10-CM

## 2024-08-27 DIAGNOSIS — H25.813: ICD-10-CM

## 2024-08-27 DIAGNOSIS — H52.4: ICD-10-CM

## 2024-08-27 PROCEDURE — 92014 COMPRE OPH EXAM EST PT 1/>: CPT

## 2024-08-27 PROCEDURE — 92133 CPTRZD OPH DX IMG PST SGM ON: CPT

## 2024-08-27 PROCEDURE — 92015 DETERMINE REFRACTIVE STATE: CPT

## 2024-08-27 ASSESSMENT — VISUAL ACUITY
OD_SC: 20/30+2
OS_SC: 20/40-3
OS_PH: 20/30-1
OU_SC: J5-1

## 2024-08-27 ASSESSMENT — TONOMETRY
OD_IOP_MMHG: 18
OS_IOP_MMHG: 15

## 2024-09-20 ENCOUNTER — CONSULTATION (OUTPATIENT)
Dept: URBAN - METROPOLITAN AREA CLINIC 110 | Facility: CLINIC | Age: 64
End: 2024-09-20

## 2024-09-20 DIAGNOSIS — H52.4: ICD-10-CM

## 2024-09-20 DIAGNOSIS — H40.013: ICD-10-CM

## 2024-09-20 DIAGNOSIS — H04.123: ICD-10-CM

## 2024-09-20 DIAGNOSIS — H25.813: ICD-10-CM

## 2024-09-20 PROCEDURE — 92004 COMPRE OPH EXAM NEW PT 1/>: CPT

## 2024-09-20 ASSESSMENT — VISUAL ACUITY
OU_SC: 20/25-2
OD_SC: 20/25-2
OS_SC: 20/40-2

## 2024-09-20 ASSESSMENT — TONOMETRY
OD_IOP_MMHG: 13
OS_IOP_MMHG: 13

## 2025-03-14 ENCOUNTER — FOLLOW UP (OUTPATIENT)
Dept: URBAN - METROPOLITAN AREA CLINIC 110 | Facility: CLINIC | Age: 65
End: 2025-03-14

## 2025-03-14 DIAGNOSIS — H40.023: ICD-10-CM

## 2025-03-14 DIAGNOSIS — H25.813: ICD-10-CM

## 2025-03-14 DIAGNOSIS — H52.4: ICD-10-CM

## 2025-03-14 DIAGNOSIS — H04.123: ICD-10-CM

## 2025-03-14 PROCEDURE — 92083 EXTENDED VISUAL FIELD XM: CPT

## 2025-03-14 PROCEDURE — 92012 INTRM OPH EXAM EST PATIENT: CPT

## 2025-03-14 PROCEDURE — 92020 GONIOSCOPY: CPT

## 2025-03-14 ASSESSMENT — VISUAL ACUITY
OS_SC: 20/40-1
OS_PH: 20/30
OD_SC: 20/50+2
OD_PH: 20/40

## 2025-03-14 ASSESSMENT — TONOMETRY
OS_IOP_MMHG: 12
OD_IOP_MMHG: 13